# Patient Record
Sex: FEMALE | Race: WHITE
[De-identification: names, ages, dates, MRNs, and addresses within clinical notes are randomized per-mention and may not be internally consistent; named-entity substitution may affect disease eponyms.]

---

## 2017-07-06 ENCOUNTER — HOSPITAL ENCOUNTER (EMERGENCY)
Dept: HOSPITAL 61 - ER | Age: 47
Discharge: HOME | End: 2017-07-06
Payer: COMMERCIAL

## 2017-07-06 VITALS
DIASTOLIC BLOOD PRESSURE: 57 MMHG | SYSTOLIC BLOOD PRESSURE: 122 MMHG | SYSTOLIC BLOOD PRESSURE: 122 MMHG | DIASTOLIC BLOOD PRESSURE: 57 MMHG | SYSTOLIC BLOOD PRESSURE: 122 MMHG | DIASTOLIC BLOOD PRESSURE: 57 MMHG | SYSTOLIC BLOOD PRESSURE: 122 MMHG | DIASTOLIC BLOOD PRESSURE: 57 MMHG

## 2017-07-06 VITALS — BODY MASS INDEX: 31.52 KG/M2 | HEIGHT: 68 IN | WEIGHT: 208 LBS

## 2017-07-06 DIAGNOSIS — R09.1: Primary | ICD-10-CM

## 2017-07-06 DIAGNOSIS — M19.90: ICD-10-CM

## 2017-07-06 LAB
ALBUMIN SERPL-MCNC: 3.8 G/DL (ref 3.4–5)
ALBUMIN/GLOB SERPL: 1.1 {RATIO} (ref 1–1.7)
ALP SERPL-CCNC: 67 U/L (ref 46–116)
ALT SERPL-CCNC: 24 U/L (ref 14–59)
ANION GAP SERPL CALC-SCNC: 9 MMOL/L (ref 6–14)
AST SERPL-CCNC: 17 U/L (ref 15–37)
BASOPHILS # BLD AUTO: 0 X10^3/UL (ref 0–0.2)
BASOPHILS NFR BLD: 1 % (ref 0–3)
BILIRUB SERPL-MCNC: 0.5 MG/DL (ref 0.2–1)
BUN SERPL-MCNC: 17 MG/DL (ref 7–20)
BUN/CREAT SERPL: 24 (ref 6–20)
CALCIUM SERPL-MCNC: 9 MG/DL (ref 8.5–10.1)
CHLORIDE SERPL-SCNC: 107 MMOL/L (ref 98–107)
CO2 SERPL-SCNC: 28 MMOL/L (ref 21–32)
CREAT SERPL-MCNC: 0.7 MG/DL (ref 0.6–1)
EOSINOPHIL NFR BLD: 1 % (ref 0–3)
ERYTHROCYTE [DISTWIDTH] IN BLOOD BY AUTOMATED COUNT: 13.8 % (ref 11.5–14.5)
GFR SERPLBLD BASED ON 1.73 SQ M-ARVRAT: 90.5 ML/MIN
GLOBULIN SER-MCNC: 3.5 G/DL (ref 2.2–3.8)
GLUCOSE SERPL-MCNC: 107 MG/DL (ref 70–99)
HCT VFR BLD CALC: 37.9 % (ref 36–47)
HGB BLD-MCNC: 12.6 G/DL (ref 12–15.5)
LYMPHOCYTES # BLD: 1.6 X10^3/UL (ref 1–4.8)
LYMPHOCYTES NFR BLD AUTO: 24 % (ref 24–48)
MCH RBC QN AUTO: 30 PG (ref 25–35)
MCHC RBC AUTO-ENTMCNC: 33 G/DL (ref 31–37)
MCV RBC AUTO: 90 FL (ref 79–100)
MONOCYTES NFR BLD: 7 % (ref 0–9)
NEUTROPHILS NFR BLD AUTO: 67 % (ref 31–73)
PLATELET # BLD AUTO: 175 X10^3/UL (ref 140–400)
POTASSIUM SERPL-SCNC: 3.4 MMOL/L (ref 3.5–5.1)
PROT SERPL-MCNC: 7.3 G/DL (ref 6.4–8.2)
RBC # BLD AUTO: 4.22 X10^6/UL (ref 3.5–5.4)
SODIUM SERPL-SCNC: 144 MMOL/L (ref 136–145)
WBC # BLD AUTO: 6.5 X10^3/UL (ref 4–11)

## 2017-07-06 PROCEDURE — 71010: CPT

## 2017-07-06 PROCEDURE — 85027 COMPLETE CBC AUTOMATED: CPT

## 2017-07-06 PROCEDURE — 93005 ELECTROCARDIOGRAM TRACING: CPT

## 2017-07-06 PROCEDURE — 85379 FIBRIN DEGRADATION QUANT: CPT

## 2017-07-06 PROCEDURE — 36415 COLL VENOUS BLD VENIPUNCTURE: CPT

## 2017-07-06 PROCEDURE — 84484 ASSAY OF TROPONIN QUANT: CPT

## 2017-07-06 PROCEDURE — 80053 COMPREHEN METABOLIC PANEL: CPT

## 2017-07-06 NOTE — PHYS DOC
Past Medical History


Past Medical History:  Arthritis


Past Surgical History:  No Surgical History


Alcohol Use:  None


Drug Use:  None





Adult General


Chief Complaint


Chief Complaint:  CHEST PAIN





HPI


HPI





Patient is a 46  year old [f__sex] who presents with []





Review of Systems


Review of Systems





Constitutional: Denies fever or chills []


Eyes: Denies change in visual acuity, redness, or eye pain []


HENT: Denies nasal congestion or sore throat []


Respiratory: Denies cough or shortness of breath []


Cardiovascular: No additional information not addressed in HPI []


GI: Denies abdominal pain, nausea, vomiting, bloody stools or diarrhea []


: Denies dysuria or hematuria []


Musculoskeletal: Denies back pain or joint pain []


Integument: Denies rash or skin lesions []


Neurologic: Denies headache, focal weakness or sensory changes []


Endocrine: Denies polyuria or polydipsia []





Current Medications


Current Medications





Current Medications








 Medications


  (Trade)  Dose


 Ordered  Sig/Beck  Start Time


 Stop Time Status Last Admin


Dose Admin


 


 Aspirin


  (Children'S


 Aspirin)  324 mg  1X  ONCE  7/6/17 18:00


 7/6/17 18:01 DC 7/6/17 17:59


324 MG


 


 Multi-Ingredient


 Mouthwash/Gargle


  (Gi Cocktail


 Single Dose)  15 ml  1X  ONCE  7/6/17 18:00


 7/6/17 18:01 DC 7/6/17 17:53


15 ML











Allergies


Allergies





Allergies








Coded Allergies Type Severity Reaction Last Updated Verified


 


  No Known Drug Allergies    7/6/17 No











Physical Exam


Physical Exam





Constitutional: Well developed, well nourished, no acute distress, non-toxic 

appearance. []


HENT: Normocephalic, atraumatic, bilateral external ears normal, oropharynx 

moist, no oral exudates, nose normal. []


Eyes: PERRLA, EOMI, conjunctiva normal, no discharge. [] 


Neck: Normal range of motion, no tenderness, supple, no stridor. [] 


Cardiovascular:Heart rate regular rhythm, no murmur []


Lungs & Thorax:  Bilateral breath sounds clear to auscultation []


Abdomen: Bowel sounds normal, soft, no tenderness, no masses, no pulsatile 

masses. [] 


Skin: Warm, dry, no erythema, no rash. [] 


Back: No tenderness, no CVA tenderness. [] 


Extremities: No tenderness, no cyanosis, no clubbing, ROM intact, no edema. [] 


Neurologic: Alert and oriented X 3, normal motor function, normal sensory 

function, no focal deficits noted. []


Psychologic: Affect normal, judgement normal, mood normal. []





Current Patient Data


Vital Signs





 Vital Signs








  Date Time  Temp Pulse Resp B/P (MAP) Pulse Ox O2 Delivery O2 Flow Rate FiO2


 


7/6/17 18:00  82 22 156/86 (109) 96 Room Air  


 


7/6/17 17:26 98.8       





 98.8       








Lab Values





 Laboratory Tests








Test


  7/6/17


17:30


 


White Blood Count


  6.5 x10^3/uL


(4.0-11.0)


 


Red Blood Count


  4.22 x10^6/uL


(3.50-5.40)


 


Hemoglobin


  12.6 g/dL


(12.0-15.5)


 


Hematocrit


  37.9 %


(36.0-47.0)


 


Mean Corpuscular Volume


  90 fL ()


 


 


Mean Corpuscular Hemoglobin 30 pg (25-35)  


 


Mean Corpuscular Hemoglobin


Concent 33 g/dL


(31-37)


 


Red Cell Distribution Width


  13.8 %


(11.5-14.5)


 


Platelet Count


  175 x10^3/uL


(140-400)


 


Neutrophils (%) (Auto) 67 % (31-73)  


 


Lymphocytes (%) (Auto) 24 % (24-48)  


 


Monocytes (%) (Auto) 7 % (0-9)  


 


Eosinophils (%) (Auto) 1 % (0-3)  


 


Basophils (%) (Auto) 1 % (0-3)  


 


Neutrophils # (Auto)


  4.4 x10^3uL


(1.8-7.7)


 


Lymphocytes # (Auto)


  1.6 x10^3/uL


(1.0-4.8)


 


Monocytes # (Auto)


  0.5 x10^3/uL


(0.0-1.1)


 


Eosinophils # (Auto)


  0.0 x10^3/uL


(0.0-0.7)


 


Basophils # (Auto)


  0.0 x10^3/uL


(0.0-0.2)


 


D-Dimer (Angelina)


  0.38 ug/mlFEU


(0.00-0.50)


 


Sodium Level


  144 mmol/L


(136-145)


 


Potassium Level


  3.4 mmol/L


(3.5-5.1)  L


 


Chloride Level


  107 mmol/L


()


 


Carbon Dioxide Level


  28 mmol/L


(21-32)


 


Anion Gap 9 (6-14)  


 


Blood Urea Nitrogen


  17 mg/dL


(7-20)


 


Creatinine


  0.7 mg/dL


(0.6-1.0)


 


Estimated GFR


(Cockcroft-Gault) 90.5  


 


 


BUN/Creatinine Ratio 24 (6-20)  H


 


Glucose Level


  107 mg/dL


(70-99)  H


 


Calcium Level


  9.0 mg/dL


(8.5-10.1)


 


Total Bilirubin


  0.5 mg/dL


(0.2-1.0)


 


Aspartate Amino Transferase


(AST) 17 U/L (15-37)


 


 


Alanine Aminotransferase (ALT)


  24 U/L (14-59)


 


 


Alkaline Phosphatase


  67 U/L


()


 


Troponin I Quantitative


  < 0.017 ng/mL


(0.000-0.055)


 


Total Protein


  7.3 g/dL


(6.4-8.2)


 


Albumin


  3.8 g/dL


(3.4-5.0)


 


Albumin/Globulin Ratio 1.1 (1.0-1.7)  





 Laboratory Tests


7/6/17 17:30








 Laboratory Tests


7/6/17 17:30














EKG


EKG


[]





Radiology/Procedures


Radiology/Procedures


[]





Course & Med Decision Making


Course & Med Decision Making


Pertinent Labs and Imaging studies reviewed. (See chart for details)





[]





Dragon Disclaimer


Dragon Disclaimer


This electronic medical record was generated, in whole or in part, using a 

voice recognition dictation system.





Departure


Departure


Impression:  


 Primary Impression:  


 Nonspecific chest pain


 Additional Impression:  


 Pleurisy


Disposition:  01 HOME, SELF-CARE


Condition:  STABLE


Referrals:  


KELSEY CHRISTENSEN (PCP)


Patient Instructions:  Chest Pain (Nonspecific), Pleurisy





Additional Instructions:  


Her chest pain this evening is pleuritic in nature. This means it's painful 

with breathing and is often a result of irritation of the lung lining known as 

the pleura. The remainder of your workup is reassuring today. Take ibuprofen 

800 mg every 6 hours as needed for pain. Your EKG indicates the possibility of 

having previously had an injury to your heart however it is possible that these 

results are normal for U. Follow-up with your doctor for reevaluation and 

referral for a stress test as an outpatient. You been given a copy of your EKG 

to discuss with your doctor and discuss in cardiology referral. Return 

immediately for new severe or worsening symptoms specifically for exertional 

chest pain. Take one baby aspirin 81 mg per day until otherwise directed by 

your doctor





Problem Qualifiers











NADEEM MUÑOZ MD Jul 6, 2017 19:32

## 2017-07-07 NOTE — EKG
[facility]

              [address] [place] [address]

Test Date:    2017               Test Time:    17:25:30

Pat Name:     KEYANA HERNANDEZ        Department:   

Patient ID:   [id number]           Room:          

Gender:       F                        Technician:   

:          1970               Requested By: NADEEM MUÑOZ

Order Number: [id number]            Reading MD:     

                                 Measurements

Intervals                              Axis          

Rate:         85                       P:            40

WV:           138                      QRS:          18

QRSD:         92                       T:            8

QT:           356                                    

QTc:          429                                    

                           Interpretive Statements

SINUS RHYTHM

QRS(T) CONTOUR ABNORMALITY

CONSISTENT WITH ANTEROSEPTAL INFARCT

PROBABLY OLD

RI6.01          Unconfirmed report

No previous ECG available for comparison

## 2017-07-07 NOTE — RAD
Portable chest, 7/6/2017:



History: Chest pain



The heart size and pulmonary vascularity are normal. The lungs are clear.

There is no evidence of pleural fluid.



IMPRESSION: No acute cardiopulmonary abnormality is detected.

## 2017-08-11 ENCOUNTER — HOSPITAL ENCOUNTER (OUTPATIENT)
Dept: HOSPITAL 61 - NM | Age: 47
Discharge: HOME | End: 2017-08-11
Attending: INTERNAL MEDICINE
Payer: COMMERCIAL

## 2017-08-11 DIAGNOSIS — R07.9: ICD-10-CM

## 2017-08-11 DIAGNOSIS — I49.5: Primary | ICD-10-CM

## 2017-08-11 PROCEDURE — 96376 TX/PRO/DX INJ SAME DRUG ADON: CPT

## 2017-08-11 PROCEDURE — 96374 THER/PROPH/DIAG INJ IV PUSH: CPT

## 2017-08-11 PROCEDURE — 93017 CV STRESS TEST TRACING ONLY: CPT

## 2017-08-11 PROCEDURE — A9500 TC99M SESTAMIBI: HCPCS

## 2017-08-11 PROCEDURE — 78452 HT MUSCLE IMAGE SPECT MULT: CPT

## 2017-08-11 PROCEDURE — 96375 TX/PRO/DX INJ NEW DRUG ADDON: CPT

## 2017-08-11 NOTE — RAD
--------------- APPROVED REPORT --------------





Test Type:          Pharmacological

Stress Nurse/Tech: Millie Saravia R.N.

Test Indications: chest pain

Cardiac History: No known cardiac

Medications:     See Electronic Medical Record

Medical History: See Electronic Medical Record

Resting ECG:     SR w/ inverted T waves in leads III,AVR, AVF, V1,V3-5

Resting Heart Rate: 66 bpm

Resting Blood Pressure: 125/80mmHg

Pretest Chest Pain: No chest pain



Nurse/Tech Notes

S1S2, lungs CTA

Consent: The procedure was explained to the patient in lay terms. Informed consent was witnessed. Diomedes
eout was entered into [employer]. History and Stress Test performed by POLA Guaman



Pharm. Details

Pharmacologic stress testing was performed using 0.4mg per 5ml of regadenoson given intravenously ove
r 7-10 seconds.



Stress Symptoms

dyspnea which resolved by the end of recovery period



POST EXERCISE

Reason for Termination: Infusion complete

Max HR: 120 bpm

Max Blood Pressure: 151/82mmHg

Blood Pressure response to exercise: Normal blood pressure response during stress.

Heart Rate response to exercise: wnl

Chest Pain: No. 

Arrhythmia: No. 

ST Change: Yes. T wave in lead AVR, [id number] and the T waves in leads II,V6. small ST depression in 
various leads post lexiscan

Deviation:   mm



INTERPRETATION

Stress EKG Conclusion: Baseline EKG showed sinus rhythm.  Non-diagnostic changes at peak stress.  No 
arrhythmias.



Imaging Protocol

IMAGE PROTOCOL: Rest Tc-99m/stress Tc-99m 1 day



Rest:            Stress:         Viability:   

Radiopharm.Tc99m WprgdqcljBe50e Sestamibi

Dose10.3mCi            34.4mCi            

Duration    15min.           12min.           

Img Date  08/11/2017 08/11/2017      



Rest Admin Site:IV - Left AntecubitalAdministrator:POLA Guaman

Stress Admin Site: IV - Left AntecubitalAdministrator: POLA Guaman



STRESS DATA

End Diast. Vol.116.0mlAv. Heart Rate88.0bpm

End Syst. Vol.35.0mlCO Index BSA0.0L/min

Myocardial Igcu591.0gEject. Hzwyhnrk84.0%



Stress Rates

Pk. Fill Rate4.32EDV/secLVtime Pk. Fill 176.73msec

Pk. Empty Rate4.36ESV/secLVtime Pk. Bqkrg729.68msec

1/3 Pk. Fill0.93EDV/sec



Stress Scores

Regional WT0.00Summed WT0.00

Regional WM0.00Summed WM0.00



Study quality was good.  Left Ventricular size was Normal at Rest and Stress.

Lung uptake was Normal.  Left Ventricular ejection fraction is 70%.

The rest and stress images show normal perfusion, normal contraction and thickening.



LV Perf. Quant

17 Seg. SSS0.00

17 Seg. SRS0.00

17 Seg. SDS0.00

Stress Defect Extent (% LAD)0.00Rest Defect Extent (% LAD)0.00Rev. Defect Extent (% LAD)0.00

Stress Defect Extent (% LCX) 0.00Rest Defect Extent (% LCX)0.00Rev. Defect Extent (% LCX)0.00

Stress Defect Extent (% RCA)0.00Rest Defect Extent (% RCA)0.00Rev. Defect Extent (% RCA)0.00

Stress Defect Extent (% HILDA)0.00Rest Defect Extent (% HILDA)0.00Rev. Defect Extent (% HILDA)0.00



Conclusion

1. Regadenoson cardioisotope stress test did not show any evidence of ischemia or infarct.

2. Normal left ventricular systolic function with ejection fraction calculated at 70%.

3. Low risk for cardiac events.

## 2018-01-15 ENCOUNTER — HOSPITAL ENCOUNTER (OUTPATIENT)
Dept: HOSPITAL 61 - KCIC MRI | Age: 48
Discharge: HOME | End: 2018-01-15
Attending: NURSE PRACTITIONER
Payer: COMMERCIAL

## 2018-01-15 DIAGNOSIS — M48.02: Primary | ICD-10-CM

## 2018-01-15 DIAGNOSIS — M25.78: ICD-10-CM

## 2018-01-15 DIAGNOSIS — M12.9: ICD-10-CM

## 2018-01-15 PROCEDURE — 72141 MRI NECK SPINE W/O DYE: CPT

## 2018-01-30 ENCOUNTER — HOSPITAL ENCOUNTER (OUTPATIENT)
Dept: HOSPITAL 61 - KCIC | Age: 48
Discharge: HOME | End: 2018-01-30
Attending: NEUROLOGICAL SURGERY
Payer: COMMERCIAL

## 2018-01-30 DIAGNOSIS — M25.522: Primary | ICD-10-CM

## 2018-01-30 PROCEDURE — 73080 X-RAY EXAM OF ELBOW: CPT

## 2020-07-30 ENCOUNTER — HOSPITAL ENCOUNTER (OUTPATIENT)
Dept: HOSPITAL 61 - MAMMO | Age: 50
Discharge: HOME | End: 2020-07-30
Attending: NURSE PRACTITIONER
Payer: COMMERCIAL

## 2020-07-30 DIAGNOSIS — R92.2: Primary | ICD-10-CM

## 2020-07-30 PROCEDURE — 77066 DX MAMMO INCL CAD BI: CPT

## 2020-07-30 PROCEDURE — 76641 ULTRASOUND BREAST COMPLETE: CPT

## 2020-07-30 NOTE — RAD
Examination:

1. Bilateral digital diagnostic mammogram.

2. Targeted right axillary ultrasound.



INDICATION: 50-year-old woman due for mammographic screening presents with a

right axillary lump.



COMPARISON: 12/9/2013.



TECHNIQUE: CC and MLO views of both breasts were obtained with 2-D technique

and a right X CCL view was also obtained. Targeted ultrasound of the right

axilla was then performed.



FINDINGS:

The breasts are composed of scattered fibroglandular densities.

There is no dominant mass, suspicious calcification or architectural

distortion in either breast.

No mammographic correlate to the area of patient reported palpable concern in

her right axilla which was marked with a BB and was not successfully included

in the field-of-view on mammogram despite multiple attempts.



Targeted ultrasound of the right axilla shows localized thickening of the skin

in the right axilla. No adenopathy or mass.



IMPRESSION:

Negative bilateral mammogram and benign right axillary findings of localized

skin thickening, possibly reflecting a sebaceous cyst. Recommend clinical

management which may include biopsy if there are any clinically suspicious

findings in the opinion of the patient's referring physician. In the absence

of a clinically suspicious finding, recommend routine mammographic screening,

next due in one year. Discussed with patient.



BI-RADS Category 2

Benign findings

## 2021-07-14 ENCOUNTER — HOSPITAL ENCOUNTER (OUTPATIENT)
Dept: HOSPITAL 61 - MAMMO | Age: 51
End: 2021-07-14
Attending: NURSE PRACTITIONER
Payer: COMMERCIAL

## 2021-07-14 DIAGNOSIS — Z12.31: Primary | ICD-10-CM

## 2021-07-14 DIAGNOSIS — M79.89: ICD-10-CM

## 2021-07-14 PROCEDURE — 76881 US COMPL JOINT R-T W/IMG: CPT

## 2021-07-14 PROCEDURE — 77067 SCR MAMMO BI INCL CAD: CPT

## 2021-07-14 NOTE — RAD
EXAM: Right axillary sonogram.



HISTORY: 51-year-old female presents for follow-up evaluation of a previously described palpable lump
 within the axilla. The patient no longer palpates a lump in this location.



TECHNIQUE: Sonographic imaging of the right axilla was performed.



COMPARISON: 7/30/2020.



FINDINGS: There is no suspicious finding within the right axilla. Specifically, the previously demons
trated 5 mm hypoechoic lesion within the superficial soft tissues at the site of reported prior palpa
ble concern is no longer seen.



IMPRESSION:

1. Unremarkable right axillary sonogram. The previously demonstrated 5 mm lesion at the site of palpa
ble concern is no longer seen and the patient reports no persistent palpable abnormality in this loca
tion.

2. BI-RADS Category 2: Benign finding(s). Please refer to the separate report for the screening mammo
gram on the same date for additional imaging findings.



Electronically signed by: Fela Fernandez MD (7/14/2021 2:55 PM) [id number]

## 2021-07-14 NOTE — RAD
EXAM: Bilateral screening mammogram.



HISTORY: 51-year-old female presents for screening mammography.



TECHNIQUE: Full-field digital craniocaudal and mediolateral oblique views of both breasts are obtaine
d for evaluation. Computer aided detection was applied.



COMPARISON: 7/30/2020



BREAST PARENCHYMAL DENSITY: Level B - Scattered fibroglandular densities.



FINDINGS: There is no new suspicious mass, microcalcification or region of architectural distortion.



IMPRESSION: BI-RADS Category 2: Benign finding(s). 



RECOMMENDATION: Annual mammography is recommended. Please refer to the separate report for a right ax
illary sonogram performed the same date for additional imaging findings.



If your mammogram demonstrates that you have dense breast tissue, which could hide abnormalities, and
 if you have other risk factors for breast cancer that have been identified, you might benefit from s
upplemental screening tests that may be suggested by your ordering physician.  Dense breast tissue, i
n and of itself, is a relatively common condition.  This information is not provided to cause undue c
oncern, but rather to raise your awareness and to promote discussion with your physician regarding th
e presence of other risk factors, in addition to dense breast tissue. A report of your mammography re
sults will be sent to you and your physician.  You should contact your physician if you have any ques
tions or concerns regarding this report.  



Mammography is a sensitive method for finding small breast cancers, but it does not detect them all a
nd is not a substitute for careful clinical examination.  A negative mammogram does not negate a clin
ically suspicious finding and should not result in delay in biopsying a clinically suspicious abnorma
lity.



PQRS compliance statement -  Patient information was entered into a reminder system with a target due
 date for the next mammogram. 



"Our facility is accredited by the [employer] Mammography Program."



Electronically signed by: Fela Fernandez MD (7/14/2021 2:53 PM) [id number]